# Patient Record
Sex: FEMALE | Race: WHITE | Employment: UNEMPLOYED | URBAN - METROPOLITAN AREA
[De-identification: names, ages, dates, MRNs, and addresses within clinical notes are randomized per-mention and may not be internally consistent; named-entity substitution may affect disease eponyms.]

---

## 2023-12-28 ENCOUNTER — APPOINTMENT (OUTPATIENT)
Dept: GENERAL RADIOLOGY | Age: 88
End: 2023-12-28

## 2023-12-28 ENCOUNTER — HOSPITAL ENCOUNTER (EMERGENCY)
Age: 88
Discharge: HOME | End: 2023-12-30
Attending: EMERGENCY MEDICINE

## 2023-12-28 VITALS
OXYGEN SATURATION: 100 % | TEMPERATURE: 103 F | WEIGHT: 160 LBS | RESPIRATION RATE: 26 BRPM | SYSTOLIC BLOOD PRESSURE: 99 MMHG | HEART RATE: 111 BPM | DIASTOLIC BLOOD PRESSURE: 50 MMHG

## 2023-12-28 DIAGNOSIS — J96.01 ACUTE RESPIRATORY FAILURE WITH HYPOXIA (HCC): ICD-10-CM

## 2023-12-28 DIAGNOSIS — J18.9 PNEUMONIA DUE TO INFECTIOUS ORGANISM, UNSPECIFIED LATERALITY, UNSPECIFIED PART OF LUNG: Primary | ICD-10-CM

## 2023-12-28 LAB
ALBUMIN SERPL-MCNC: 3 G/DL (ref 3.2–4.6)
ALBUMIN/GLOB SERPL: 0.8 (ref 0.4–1.6)
ALP SERPL-CCNC: 155 U/L (ref 50–136)
ALT SERPL-CCNC: 42 U/L (ref 12–65)
ANION GAP BLD CALC-SCNC: ABNORMAL MMOL/L
ANION GAP SERPL CALC-SCNC: 9 MMOL/L (ref 2–11)
ARTERIAL PATENCY WRIST A: POSITIVE
AST SERPL-CCNC: 71 U/L (ref 15–37)
BASE DEFICIT BLD-SCNC: 1.6 MMOL/L
BASOPHILS # BLD: 0.1 K/UL (ref 0–0.2)
BASOPHILS NFR BLD: 0 % (ref 0–2)
BDY SITE: ABNORMAL
BILIRUB SERPL-MCNC: 0.8 MG/DL (ref 0.2–1.1)
BUN SERPL-MCNC: 29 MG/DL (ref 8–23)
CA-I BLD-MCNC: 1.07 MMOL/L (ref 1.12–1.32)
CALCIUM SERPL-MCNC: 8.5 MG/DL (ref 8.3–10.4)
CHLORIDE SERPL-SCNC: 101 MMOL/L (ref 103–113)
CO2 BLD-SCNC: 24 MMOL/L (ref 13–23)
CO2 SERPL-SCNC: 27 MMOL/L (ref 21–32)
CREAT SERPL-MCNC: 1.6 MG/DL (ref 0.6–1)
DIFFERENTIAL METHOD BLD: ABNORMAL
EOSINOPHIL # BLD: 0.1 K/UL (ref 0–0.8)
EOSINOPHIL NFR BLD: 0 % (ref 0.5–7.8)
ERYTHROCYTE [DISTWIDTH] IN BLOOD BY AUTOMATED COUNT: 14 % (ref 11.9–14.6)
FIO2 ON VENT: 100 %
FLUAV RNA SPEC QL NAA+PROBE: DETECTED
FLUBV RNA SPEC QL NAA+PROBE: NOT DETECTED
GAS FLOW.O2 O2 DELIVERY SYS: ABNORMAL
GLOBULIN SER CALC-MCNC: 4 G/DL (ref 2.8–4.5)
GLUCOSE SERPL-MCNC: 267 MG/DL (ref 65–100)
HCO3 BLD-SCNC: 24.3 MMOL/L (ref 22–26)
HCT VFR BLD AUTO: 43.6 % (ref 35.8–46.3)
HGB BLD-MCNC: 13.2 G/DL (ref 11.7–15.4)
IMM GRANULOCYTES # BLD AUTO: 0.3 K/UL (ref 0–0.5)
IMM GRANULOCYTES NFR BLD AUTO: 2 % (ref 0–5)
IPAP/PIP: 32
LACTATE SERPL-SCNC: 3.4 MMOL/L (ref 0.4–2)
LACTATE SERPL-SCNC: 4.5 MMOL/L (ref 0.4–2)
LIPASE SERPL-CCNC: 95 U/L (ref 73–393)
LYMPHOCYTES # BLD: 0.8 K/UL (ref 0.5–4.6)
LYMPHOCYTES NFR BLD: 5 % (ref 13–44)
MCH RBC QN AUTO: 26.7 PG (ref 26.1–32.9)
MCHC RBC AUTO-ENTMCNC: 30.3 G/DL (ref 31.4–35)
MCV RBC AUTO: 88.1 FL (ref 82–102)
MONOCYTES # BLD: 0.1 K/UL (ref 0.1–1.3)
MONOCYTES NFR BLD: 1 % (ref 4–12)
NEUTS SEG # BLD: 14.1 K/UL (ref 1.7–8.2)
NEUTS SEG NFR BLD: 92 % (ref 43–78)
NRBC # BLD: 0.02 K/UL (ref 0–0.2)
NT PRO BNP: 4665 PG/ML
PCO2 BLD: 44.4 MMHG (ref 35–45)
PEEP RESPIRATORY: 8
PH BLD: 7.35 (ref 7.35–7.45)
PLATELET # BLD AUTO: 219 K/UL (ref 150–450)
PMV BLD AUTO: 9.6 FL (ref 9.4–12.3)
PO2 BLD: 344 MMHG (ref 75–100)
POTASSIUM BLD-SCNC: 3.9 MMOL/L (ref 3.5–5.1)
POTASSIUM SERPL-SCNC: 4.6 MMOL/L (ref 3.5–5.1)
PROT SERPL-MCNC: 7 G/DL (ref 6.3–8.2)
RBC # BLD AUTO: 4.95 M/UL (ref 4.05–5.2)
RESPIRATORY RATE: 26
SAO2 % BLD: 100 %
SARS-COV-2 RDRP RESP QL NAA+PROBE: NOT DETECTED
SERVICE CMNT-IMP: ABNORMAL
SODIUM BLD-SCNC: 139 MMOL/L (ref 136–145)
SODIUM SERPL-SCNC: 137 MMOL/L (ref 136–146)
SOURCE: NORMAL
SPECIMEN SITE: ABNORMAL
TROPONIN I SERPL HS-MCNC: 1954.6 PG/ML (ref 0–14)
TROPONIN I SERPL HS-MCNC: 6330.5 PG/ML (ref 0–14)
VENTILATION MODE VENT: ABNORMAL
VT SETTING VENT: 350
WBC # BLD AUTO: 15.3 K/UL (ref 4.3–11.1)

## 2023-12-28 PROCEDURE — 76937 US GUIDE VASCULAR ACCESS: CPT

## 2023-12-28 PROCEDURE — 82330 ASSAY OF CALCIUM: CPT

## 2023-12-28 PROCEDURE — 87635 SARS-COV-2 COVID-19 AMP PRB: CPT

## 2023-12-28 PROCEDURE — 96375 TX/PRO/DX INJ NEW DRUG ADDON: CPT

## 2023-12-28 PROCEDURE — 6360000002 HC RX W HCPCS

## 2023-12-28 PROCEDURE — 84132 ASSAY OF SERUM POTASSIUM: CPT

## 2023-12-28 PROCEDURE — 84484 ASSAY OF TROPONIN QUANT: CPT

## 2023-12-28 PROCEDURE — 6360000002 HC RX W HCPCS: Performed by: EMERGENCY MEDICINE

## 2023-12-28 PROCEDURE — 82947 ASSAY GLUCOSE BLOOD QUANT: CPT

## 2023-12-28 PROCEDURE — 85025 COMPLETE CBC W/AUTO DIFF WBC: CPT

## 2023-12-28 PROCEDURE — 71045 X-RAY EXAM CHEST 1 VIEW: CPT

## 2023-12-28 PROCEDURE — 2580000003 HC RX 258: Performed by: EMERGENCY MEDICINE

## 2023-12-28 PROCEDURE — 31500 INSERT EMERGENCY AIRWAY: CPT

## 2023-12-28 PROCEDURE — 87040 BLOOD CULTURE FOR BACTERIA: CPT

## 2023-12-28 PROCEDURE — 83690 ASSAY OF LIPASE: CPT

## 2023-12-28 PROCEDURE — 80053 COMPREHEN METABOLIC PANEL: CPT

## 2023-12-28 PROCEDURE — 51702 INSERT TEMP BLADDER CATH: CPT

## 2023-12-28 PROCEDURE — 83605 ASSAY OF LACTIC ACID: CPT

## 2023-12-28 PROCEDURE — 83880 ASSAY OF NATRIURETIC PEPTIDE: CPT

## 2023-12-28 PROCEDURE — 87502 INFLUENZA DNA AMP PROBE: CPT

## 2023-12-28 PROCEDURE — 99285 EMERGENCY DEPT VISIT HI MDM: CPT

## 2023-12-28 PROCEDURE — 82803 BLOOD GASES ANY COMBINATION: CPT

## 2023-12-28 PROCEDURE — 84295 ASSAY OF SERUM SODIUM: CPT

## 2023-12-28 PROCEDURE — 96374 THER/PROPH/DIAG INJ IV PUSH: CPT

## 2023-12-28 RX ORDER — PROPOFOL 10 MG/ML
5-50 INJECTION, EMULSION INTRAVENOUS
Status: COMPLETED | OUTPATIENT
Start: 2023-12-28 | End: 2023-12-28

## 2023-12-28 RX ORDER — FUROSEMIDE 10 MG/ML
40 INJECTION INTRAMUSCULAR; INTRAVENOUS ONCE
Status: COMPLETED | OUTPATIENT
Start: 2023-12-28 | End: 2023-12-28

## 2023-12-28 RX ORDER — ACETAMINOPHEN 650 MG/1
650 SUPPOSITORY RECTAL
Status: ACTIVE | OUTPATIENT
Start: 2023-12-28 | End: 2023-12-28

## 2023-12-28 RX ORDER — PROPOFOL 10 MG/ML
5-50 INJECTION, EMULSION INTRAVENOUS CONTINUOUS
Status: DISCONTINUED | OUTPATIENT
Start: 2023-12-28 | End: 2023-12-30 | Stop reason: HOSPADM

## 2023-12-28 RX ORDER — PROPOFOL 10 MG/ML
INJECTION, EMULSION INTRAVENOUS
Status: COMPLETED
Start: 2023-12-28 | End: 2023-12-28

## 2023-12-28 RX ADMIN — PROPOFOL 20 MCG/KG/MIN: 10 INJECTION, EMULSION INTRAVENOUS at 04:27

## 2023-12-28 RX ADMIN — PROPOFOL 20 MCG/KG/MIN: 10 INJECTION, EMULSION INTRAVENOUS at 04:32

## 2023-12-28 RX ADMIN — FUROSEMIDE 40 MG: 10 INJECTION, SOLUTION INTRAMUSCULAR; INTRAVENOUS at 04:52

## 2023-12-28 RX ADMIN — WATER 1000 MG: 1 INJECTION INTRAMUSCULAR; INTRAVENOUS; SUBCUTANEOUS at 04:51

## 2023-12-28 ASSESSMENT — PULMONARY FUNCTION TESTS
PIF_VALUE: 32
PIF_VALUE: 36

## 2023-12-28 NOTE — ED NOTES
EMS picked patient up from home at [address] [address] The number that called 911 was [phone] but there was not a name given.       Cy Moser RN  12/28/23 9727

## 2023-12-28 NOTE — ED PROVIDER NOTES
Lymphocytes Absolute 0.8 0.5 - 4.6 K/UL    Monocytes Absolute 0.1 0.1 - 1.3 K/UL    Eosinophils Absolute 0.1 0.0 - 0.8 K/UL    Basophils Absolute 0.1 0.0 - 0.2 K/UL    Absolute Immature Granulocyte 0.3 0.0 - 0.5 K/UL   CMP   Result Value Ref Range    Sodium 137 136 - 146 mmol/L    Potassium 4.6 3.5 - 5.1 mmol/L    Chloride 101 (L) 103 - 113 mmol/L    CO2 27 21 - 32 mmol/L    Anion Gap 9 2 - 11 mmol/L    Glucose 267 (H) 65 - 100 mg/dL    BUN 29 (H) 8 - 23 MG/DL    Creatinine 1.60 (H) 0.6 - 1.0 MG/DL    Est, Glom Filt Rate 22 (L) >60 ml/min/1.73m2    Calcium 8.5 8.3 - 10.4 MG/DL    Total Bilirubin 0.8 0.2 - 1.1 MG/DL    ALT 42 12 - 65 U/L    AST 71 (H) 15 - 37 U/L    Alk Phosphatase 155 (H) 50 - 136 U/L    Total Protein 7.0 6.3 - 8.2 g/dL    Albumin 3.0 (L) 3.2 - 4.6 g/dL    Globulin 4.0 2.8 - 4.5 g/dL    Albumin/Globulin Ratio 0.8 0.4 - 1.6     Lactate, Sepsis   Result Value Ref Range    Lactic Acid, Sepsis 4.5 (HH) 0.4 - 2.0 MMOL/L   Lipase   Result Value Ref Range    Lipase 95 73 - 393 U/L   Troponin   Result Value Ref Range    Troponin, High Sensitivity 1,954.6 (HH) 0 - 14 pg/mL   POCT Blood Gas & Electrolytes   Result Value Ref Range    POC pH 7.35 7.35 - 7.45      POC pCO2 44.4 35 - 45 MMHG    POC PO2 344 (H) 75 - 100 MMHG    POC Sodium 139 136 - 145 MMOL/L    POC Potassium 3.9 3.5 - 5.1 MMOL/L    POC Ionized Calcium 1.07 (L) 1.12 - 1.32 mmol/L    BASE DEFICIT (POC) 1.6 mmol/L    POC HCO3 24.3 22 - 26 MMOL/L    POC TCO2 24 (H) 13 - 23 MMOL/L    POC O2  %    Source ARTERIAL      Site LEFT RADIAL      Remy Test Positive      DEVICE ADULT VENT      Mode Pressure regulated volume control      FIO2 Arterial 100 %    POC TIDAL VOLUME 350      POC PEEP/CPA 8      IPAP/PIP 32      Respiratory Rate 26      Performed by: Rubi     Anion Gap, POC Cannot be calculated  Cannot be calculated   mmol/L    eGFR, POC Cannot be calculated >60 ml/min/1.73m2    Critical Value Read Back [facility]     Respiratory  Comment: 11VE         XR CHEST PORTABLE   Final Result   Moderate bilateral pulmonary infiltrates. Trace right effusion. Voice dictation software was used during the making of this note. This software is not perfect and grammatical and other typographical errors may be present. This note has not been completely proofread for errors.       Santi Ly [place]  12/28/23 6614

## 2023-12-28 NOTE — ED TRIAGE NOTES
Pt arrives via [facility] from home. Pt called out for respiratory distress. Upon EMS arrival, pt found to be unresponsive and in respiratory distress. 76% on RA rales heard throughout lung fields. RSI in route ETT 7.5 measured 27 @ the teeth. Hx of CHF non complaint with lasix. Unable to obtain any other medical hx at this time. Pt has no ID present and no family present at this time.      20mg etomidate  150 succ  146 ketamine

## 2023-12-28 NOTE — PROGRESS NOTES
Patient was intubated by EMS in the field with a number 7.0 ET Tube. Tube placement verified by auscultation and CXR. ET Tube is secured at the 22 cm yang at the lip and on the center side. Breath sounds are coarse and rhonchi. Patient is Negative for subcutaneous air and chest excursion is symmetric. Trachea is midline. Patient is also Negative for cyanosis and is Negative for pitting edema. Patient placed on ventilator on documented settings. All alarms are set and audible. Resuscitation bag is  at the head of the bed. Ventilator Settings  Mode FIO2 Rate Tidal Volume PEEP I:E Ratio I Time   AC/PRVC  100 %  26    350 mL   8 1:1:9  0.80 s     Peak airway pressure:  32 cmH2O   Minute ventilation: 11 L/min  Mean Airway Pressure: 13 cmH2O  Driving Pressure: 13  cmH2O  Pplat: 22 cmH2O      ABG obtained @ 0532 on documented settings. 12/28/23 05:32   POC pH 7.35   POC pCO2 44.4   POC PO2 344 (H)   POC HCO3 24.3   POC O2    POC TCO2 24 (H)   POC PEEP/CPA 8   POC TIDAL VOLUME 350   BASE DEFICIT (POC) 1.6   POC Ionized Calcium 1.07 (L)   POC Potassium 3.9   POC Sodium 139      notified of ABG results.  FiO2 weaned to 60%        Amandeep Washburn RCP

## 2023-12-28 NOTE — PROGRESS NOTES
US Guided PIV access    Called for assistance with IV access. Ultrasound was used to find the vein which was compressible and does not have any features of an artery or nerve bundle. Skin was cleaned and disinfected prior to IV puncture. Under real-time ultrasound guidance, peripheral access was obtained in the left upper arm using 20 G 2.5\" B Hansen Deep Access x 1 attempt. Blood return was present and IV flushed without difficulty. IV dressing applied, no immediate complications noted, and patient tolerated the procedure well. US Guided PIV access    Called for assistance with IV access. Ultrasound was used to find the vein which was compressible and does not have any features of an artery or nerve bundle. Skin was cleaned and disinfected prior to IV puncture. Under real-time ultrasound guidance, peripheral access was obtained in the right AC using 22 G 1.75\" Peripheral IV catheter x 1 attempt. Blood return was present and IV flushed without difficulty. IV dressing applied, no immediate complications noted, and patient tolerated the procedure well.

## 2023-12-29 LAB
ARTERIAL PATENCY WRIST A: POSITIVE
BASE DEFICIT BLD-SCNC: 14.5 MMOL/L
BDY SITE: ABNORMAL
GAS FLOW.O2 O2 DELIVERY SYS: ABNORMAL
HCO3 BLD-SCNC: 14.1 MMOL/L (ref 22–26)
INSPIRATION.DURATION SETTING TIME VENT: 0.8 SEC
IPAP/PIP/HIGH PEEP: 28
O2/TOTAL GAS SETTING VFR VENT: 40 %
PAW @ MEAN EXP FLOW ON VENT: 16 CMH2O
PCO2 BLD: 41.8 MMHG (ref 35–45)
PEEP RESPIRATORY: 8 CMH2O
PH BLD: 7.14 (ref 7.35–7.45)
PO2 BLD: 159 MMHG (ref 75–100)
SAO2 % BLD: 98.8 % (ref 95–98)
SERVICE CMNT-IMP: ABNORMAL
SPECIMEN TYPE: ABNORMAL
VENTILATION MODE VENT: ABNORMAL
VT SETTING VENT: 350 ML

## 2023-12-29 PROCEDURE — 36600 WITHDRAWAL OF ARTERIAL BLOOD: CPT

## 2023-12-29 PROCEDURE — 82803 BLOOD GASES ANY COMBINATION: CPT

## 2023-12-31 LAB
BACTERIA SPEC CULT: NORMAL
BACTERIA SPEC CULT: NORMAL
SERVICE CMNT-IMP: NORMAL
SERVICE CMNT-IMP: NORMAL
